# Patient Record
Sex: FEMALE | Race: WHITE | URBAN - METROPOLITAN AREA
[De-identification: names, ages, dates, MRNs, and addresses within clinical notes are randomized per-mention and may not be internally consistent; named-entity substitution may affect disease eponyms.]

---

## 2017-03-07 ENCOUNTER — TELEPHONE (OUTPATIENT)
Dept: FAMILY MEDICINE | Facility: CLINIC | Age: 22
End: 2017-03-07

## 2017-03-07 NOTE — TELEPHONE ENCOUNTER
"  Panel Management Review      Patient has the following on her problem list:     Hypertension   Last three blood pressure readings:  BP Readings from Last 3 Encounters:   08/04/16 142/90   07/19/16 (!) 144/92   07/14/16 (!) 132/92     Blood pressure: FAILED    HTN Guidelines:  Age 18-59 BP range:  Less than 140/90  Age 60-85 with Diabetes:  Less than 140/90  Age 60-85 without Diabetes:  less than 150/90      Composite cancer screening  Chart review shows that this patient is due/due soon for the following None  Summary:    Patient is due/failing the following:   BP CHECK    Action needed:   Patient needs nurse only appointment.    Type of outreach:    none see previous notes:   Called back, \"I live in [place] so I won't be in there anytime soon, My BP has been fine when I check it, and I am taking the medication. \"  She is at college, and will come in for RN bp recheck or to see PCP when she is home from [facility].      11/10/16- patient is at college in [place], and will come in for bp recheck when she is in town, please don't call her again for panel mgmt.   Tanesha Torres RNC  Questions for provider review:    None                                                                                                                                    Tanesha Torres RNC               "

## 2017-04-14 DIAGNOSIS — Z30.41 SURVEILLANCE OF PREVIOUSLY PRESCRIBED CONTRACEPTIVE PILL: ICD-10-CM

## 2017-04-14 NOTE — TELEPHONE ENCOUNTER
DREW   Last Written Prescription Date: 05/20/2016  Last Fill Quantity: 84,  # refills: 3   Last Office Visit with [facility], [facility] or [facility] prescribing provider: 08/04/2016

## 2017-04-18 RX ORDER — NORGESTIMATE AND ETHINYL ESTRADIOL 0.25-0.035
KIT ORAL
Qty: 84 TABLET | Refills: 1 | Status: SHIPPED | OUTPATIENT
Start: 2017-04-18 | End: 2017-09-11

## 2017-05-24 ENCOUNTER — MYC MEDICAL ADVICE (OUTPATIENT)
Dept: FAMILY MEDICINE | Facility: CLINIC | Age: 22
End: 2017-05-24

## 2017-05-24 NOTE — TELEPHONE ENCOUNTER
I called her and discussed. She has refills. Will be seen by August somewhere.   Tanesha Torres RNC

## 2017-09-11 DIAGNOSIS — Z30.41 SURVEILLANCE OF PREVIOUSLY PRESCRIBED CONTRACEPTIVE PILL: ICD-10-CM

## 2017-09-11 NOTE — TELEPHONE ENCOUNTER
Gina    Last Written Prescription Date: 04/18/17  Last Fill Quantity: 84,  # refills: 1   Last Office Visit with [facility], [facility] or [facility] prescribing provider: 08/04/16

## 2017-09-12 RX ORDER — NORGESTIMATE AND ETHINYL ESTRADIOL 0.25-0.035
1 KIT ORAL DAILY
Qty: 30 TABLET | Refills: 0 | Status: SHIPPED | OUTPATIENT
Start: 2017-09-12 | End: 2017-09-19

## 2017-10-08 DIAGNOSIS — Z30.41 SURVEILLANCE OF PREVIOUSLY PRESCRIBED CONTRACEPTIVE PILL: ICD-10-CM

## 2017-10-10 NOTE — TELEPHONE ENCOUNTER
Just prescribed for a year on 09/19/2017. New pharmacy requesting script. Can this be transferred?      Luis TATE (R)

## 2017-10-13 RX ORDER — NORGESTIMATE AND ETHINYL ESTRADIOL 0.25-0.035
KIT ORAL
Qty: 28 TABLET | Refills: 0 | OUTPATIENT
Start: 2017-10-13

## 2017-10-13 NOTE — TELEPHONE ENCOUNTER
Called patient notified that she needs to call and have the pharmacy transfer her prescription already sent to [facility].    Marni Kern RN

## 2018-04-06 ENCOUNTER — MYC MEDICAL ADVICE (OUTPATIENT)
Dept: FAMILY MEDICINE | Facility: CLINIC | Age: 23
End: 2018-04-06